# Patient Record
Sex: MALE | Race: WHITE | Employment: FULL TIME | URBAN - METROPOLITAN AREA
[De-identification: names, ages, dates, MRNs, and addresses within clinical notes are randomized per-mention and may not be internally consistent; named-entity substitution may affect disease eponyms.]

---

## 2022-01-16 ENCOUNTER — HOSPITAL ENCOUNTER (OUTPATIENT)
Age: 52
Setting detail: OBSERVATION
Discharge: HOME | End: 2022-01-17
Attending: EMERGENCY MEDICINE | Admitting: INTERNAL MEDICINE
Payer: COMMERCIAL

## 2022-01-16 ENCOUNTER — HOSPITAL ENCOUNTER (INPATIENT)
Dept: MRI IMAGING | Age: 52
Discharge: HOME | End: 2022-01-16
Attending: INTERNAL MEDICINE
Payer: COMMERCIAL

## 2022-01-16 ENCOUNTER — APPOINTMENT (OUTPATIENT)
Dept: CT IMAGING | Age: 52
End: 2022-01-16
Attending: EMERGENCY MEDICINE
Payer: COMMERCIAL

## 2022-01-16 ENCOUNTER — APPOINTMENT (OUTPATIENT)
Dept: GENERAL RADIOLOGY | Age: 52
End: 2022-01-16
Attending: INTERNAL MEDICINE
Payer: COMMERCIAL

## 2022-01-16 DIAGNOSIS — R42 DIZZINESS: ICD-10-CM

## 2022-01-16 DIAGNOSIS — G45.9 TIA (TRANSIENT ISCHEMIC ATTACK): Primary | ICD-10-CM

## 2022-01-16 PROBLEM — E87.1 HYPONATREMIA: Status: ACTIVE | Noted: 2022-01-16

## 2022-01-16 PROBLEM — E11.9 DM (DIABETES MELLITUS), TYPE 2 (HCC): Status: ACTIVE | Noted: 2022-01-16

## 2022-01-16 PROBLEM — F10.10 ALCOHOL ABUSE: Status: ACTIVE | Noted: 2022-01-16

## 2022-01-16 PROBLEM — I10 HTN (HYPERTENSION), BENIGN: Status: ACTIVE | Noted: 2022-01-16

## 2022-01-16 PROBLEM — F19.90 SUBSTANCE USE: Status: ACTIVE | Noted: 2022-01-16

## 2022-01-16 PROBLEM — G92.8 TOXIC METABOLIC ENCEPHALOPATHY: Status: ACTIVE | Noted: 2022-01-16

## 2022-01-16 PROBLEM — R29.898 LOWER EXTREMITY WEAKNESS: Status: ACTIVE | Noted: 2022-01-16

## 2022-01-16 LAB
ALBUMIN SERPL-MCNC: 3.4 G/DL (ref 3.5–5)
ALBUMIN/GLOB SERPL: 0.8 {RATIO} (ref 1.1–2.2)
ALP SERPL-CCNC: 65 U/L (ref 45–117)
ALT SERPL-CCNC: 33 U/L (ref 12–78)
AMPHET UR QL SCN: NEGATIVE
ANION GAP SERPL CALC-SCNC: 7 MMOL/L (ref 5–15)
AST SERPL-CCNC: 27 U/L (ref 15–37)
ATRIAL RATE: 64 BPM
BARBITURATES UR QL SCN: NEGATIVE
BASOPHILS # BLD: 0.1 K/UL (ref 0–0.1)
BASOPHILS NFR BLD: 1 % (ref 0–1)
BENZODIAZ UR QL: NEGATIVE
BILIRUB SERPL-MCNC: 0.7 MG/DL (ref 0.2–1)
BUN SERPL-MCNC: 17 MG/DL (ref 6–20)
BUN/CREAT SERPL: 21 (ref 12–20)
CALCIUM SERPL-MCNC: 9.7 MG/DL (ref 8.5–10.1)
CALCULATED P AXIS, ECG09: 15 DEGREES
CALCULATED R AXIS, ECG10: 30 DEGREES
CALCULATED T AXIS, ECG11: 110 DEGREES
CANNABINOIDS UR QL SCN: POSITIVE
CHLORIDE SERPL-SCNC: 97 MMOL/L (ref 97–108)
CO2 SERPL-SCNC: 26 MMOL/L (ref 21–32)
COCAINE UR QL SCN: POSITIVE
COMMENT, HOLDF: NORMAL
COMMENT, HOLDF: NORMAL
COVID-19 RAPID TEST, COVR: NOT DETECTED
CREAT SERPL-MCNC: 0.82 MG/DL (ref 0.7–1.3)
DIAGNOSIS, 93000: NORMAL
DIFFERENTIAL METHOD BLD: ABNORMAL
DRUG SCRN COMMENT,DRGCM: ABNORMAL
EOSINOPHIL # BLD: 0.3 K/UL (ref 0–0.4)
EOSINOPHIL NFR BLD: 3 % (ref 0–7)
ERYTHROCYTE [DISTWIDTH] IN BLOOD BY AUTOMATED COUNT: 13.6 % (ref 11.5–14.5)
ETHANOL SERPL-MCNC: <10 MG/DL
GLOBULIN SER CALC-MCNC: 4.4 G/DL (ref 2–4)
GLUCOSE BLD STRIP.AUTO-MCNC: 123 MG/DL (ref 65–117)
GLUCOSE BLD STRIP.AUTO-MCNC: 143 MG/DL (ref 65–117)
GLUCOSE BLD STRIP.AUTO-MCNC: 187 MG/DL (ref 65–117)
GLUCOSE BLD STRIP.AUTO-MCNC: 194 MG/DL (ref 65–117)
GLUCOSE SERPL-MCNC: 169 MG/DL (ref 65–100)
HCT VFR BLD AUTO: 39.8 % (ref 36.6–50.3)
HGB BLD-MCNC: 13.2 G/DL (ref 12.1–17)
IMM GRANULOCYTES # BLD AUTO: 0 K/UL (ref 0–0.04)
IMM GRANULOCYTES NFR BLD AUTO: 0 % (ref 0–0.5)
INR PPP: 1 (ref 0.9–1.1)
LYMPHOCYTES # BLD: 1.8 K/UL (ref 0.8–3.5)
LYMPHOCYTES NFR BLD: 20 % (ref 12–49)
MAGNESIUM SERPL-MCNC: 2.4 MG/DL (ref 1.6–2.4)
MCH RBC QN AUTO: 30 PG (ref 26–34)
MCHC RBC AUTO-ENTMCNC: 33.2 G/DL (ref 30–36.5)
MCV RBC AUTO: 90.5 FL (ref 80–99)
METHADONE UR QL: NEGATIVE
MONOCYTES # BLD: 0.8 K/UL (ref 0–1)
MONOCYTES NFR BLD: 9 % (ref 5–13)
NEUTS SEG # BLD: 6.1 K/UL (ref 1.8–8)
NEUTS SEG NFR BLD: 68 % (ref 32–75)
NRBC # BLD: 0 K/UL (ref 0–0.01)
NRBC BLD-RTO: 0 PER 100 WBC
OPIATES UR QL: NEGATIVE
P-R INTERVAL, ECG05: 156 MS
PCP UR QL: NEGATIVE
PLATELET # BLD AUTO: 133 K/UL (ref 150–400)
PMV BLD AUTO: 11.3 FL (ref 8.9–12.9)
POTASSIUM SERPL-SCNC: 4.5 MMOL/L (ref 3.5–5.1)
PROT SERPL-MCNC: 7.8 G/DL (ref 6.4–8.2)
PROTHROMBIN TIME: 10.4 SEC (ref 9–11.1)
Q-T INTERVAL, ECG07: 414 MS
QRS DURATION, ECG06: 88 MS
QTC CALCULATION (BEZET), ECG08: 427 MS
RBC # BLD AUTO: 4.4 M/UL (ref 4.1–5.7)
SAMPLES BEING HELD,HOLD: NORMAL
SAMPLES BEING HELD,HOLD: NORMAL
SERVICE CMNT-IMP: ABNORMAL
SODIUM SERPL-SCNC: 130 MMOL/L (ref 136–145)
SOURCE, COVRS: NORMAL
TROPONIN-HIGH SENSITIVITY: 6 NG/L (ref 0–76)
TSH SERPL DL<=0.05 MIU/L-ACNC: 0.75 UIU/ML (ref 0.36–3.74)
VENTRICULAR RATE, ECG03: 64 BPM
WBC # BLD AUTO: 9 K/UL (ref 4.1–11.1)

## 2022-01-16 PROCEDURE — 82962 GLUCOSE BLOOD TEST: CPT

## 2022-01-16 PROCEDURE — 65660000000 HC RM CCU STEPDOWN

## 2022-01-16 PROCEDURE — 74011000250 HC RX REV CODE- 250: Performed by: INTERNAL MEDICINE

## 2022-01-16 PROCEDURE — 96375 TX/PRO/DX INJ NEW DRUG ADDON: CPT

## 2022-01-16 PROCEDURE — 36415 COLL VENOUS BLD VENIPUNCTURE: CPT

## 2022-01-16 PROCEDURE — 84484 ASSAY OF TROPONIN QUANT: CPT

## 2022-01-16 PROCEDURE — 82077 ASSAY SPEC XCP UR&BREATH IA: CPT

## 2022-01-16 PROCEDURE — 83735 ASSAY OF MAGNESIUM: CPT

## 2022-01-16 PROCEDURE — 70551 MRI BRAIN STEM W/O DYE: CPT

## 2022-01-16 PROCEDURE — 93005 ELECTROCARDIOGRAM TRACING: CPT

## 2022-01-16 PROCEDURE — 74011250636 HC RX REV CODE- 250/636: Performed by: INTERNAL MEDICINE

## 2022-01-16 PROCEDURE — 70496 CT ANGIOGRAPHY HEAD: CPT

## 2022-01-16 PROCEDURE — 87635 SARS-COV-2 COVID-19 AMP PRB: CPT

## 2022-01-16 PROCEDURE — 74011250636 HC RX REV CODE- 250/636

## 2022-01-16 PROCEDURE — 80053 COMPREHEN METABOLIC PANEL: CPT

## 2022-01-16 PROCEDURE — 96374 THER/PROPH/DIAG INJ IV PUSH: CPT

## 2022-01-16 PROCEDURE — 0042T CT CODE NEURO PERF W CBF: CPT

## 2022-01-16 PROCEDURE — 85025 COMPLETE CBC W/AUTO DIFF WBC: CPT

## 2022-01-16 PROCEDURE — 96366 THER/PROPH/DIAG IV INF ADDON: CPT

## 2022-01-16 PROCEDURE — 84443 ASSAY THYROID STIM HORMONE: CPT

## 2022-01-16 PROCEDURE — 71045 X-RAY EXAM CHEST 1 VIEW: CPT

## 2022-01-16 PROCEDURE — 96365 THER/PROPH/DIAG IV INF INIT: CPT

## 2022-01-16 PROCEDURE — 85610 PROTHROMBIN TIME: CPT

## 2022-01-16 PROCEDURE — 99285 EMERGENCY DEPT VISIT HI MDM: CPT

## 2022-01-16 PROCEDURE — 74011636637 HC RX REV CODE- 636/637: Performed by: INTERNAL MEDICINE

## 2022-01-16 PROCEDURE — 70450 CT HEAD/BRAIN W/O DYE: CPT

## 2022-01-16 PROCEDURE — 74011000636 HC RX REV CODE- 636: Performed by: STUDENT IN AN ORGANIZED HEALTH CARE EDUCATION/TRAINING PROGRAM

## 2022-01-16 PROCEDURE — 96372 THER/PROPH/DIAG INJ SC/IM: CPT

## 2022-01-16 PROCEDURE — G0378 HOSPITAL OBSERVATION PER HR: HCPCS

## 2022-01-16 PROCEDURE — 80307 DRUG TEST PRSMV CHEM ANLYZR: CPT

## 2022-01-16 RX ORDER — ACETAMINOPHEN 325 MG/1
650 TABLET ORAL
Status: DISCONTINUED | OUTPATIENT
Start: 2022-01-16 | End: 2022-01-17 | Stop reason: HOSPADM

## 2022-01-16 RX ORDER — MAGNESIUM SULFATE 100 %
4 CRYSTALS MISCELLANEOUS AS NEEDED
Status: DISCONTINUED | OUTPATIENT
Start: 2022-01-16 | End: 2022-01-17 | Stop reason: HOSPADM

## 2022-01-16 RX ORDER — LABETALOL HCL 20 MG/4 ML
5 SYRINGE (ML) INTRAVENOUS
Status: DISCONTINUED | OUTPATIENT
Start: 2022-01-16 | End: 2022-01-17 | Stop reason: HOSPADM

## 2022-01-16 RX ORDER — SODIUM CHLORIDE 9 MG/ML
75 INJECTION, SOLUTION INTRAVENOUS CONTINUOUS
Status: DISPENSED | OUTPATIENT
Start: 2022-01-16 | End: 2022-01-17

## 2022-01-16 RX ORDER — ONDANSETRON 2 MG/ML
4 INJECTION INTRAMUSCULAR; INTRAVENOUS
Status: COMPLETED | OUTPATIENT
Start: 2022-01-16 | End: 2022-01-16

## 2022-01-16 RX ORDER — DEXTROSE 50 % IN WATER (D50W) INTRAVENOUS SYRINGE
12.5-25 AS NEEDED
Status: DISCONTINUED | OUTPATIENT
Start: 2022-01-16 | End: 2022-01-17 | Stop reason: HOSPADM

## 2022-01-16 RX ORDER — INSULIN LISPRO 100 [IU]/ML
INJECTION, SOLUTION INTRAVENOUS; SUBCUTANEOUS
Status: DISCONTINUED | OUTPATIENT
Start: 2022-01-16 | End: 2022-01-17 | Stop reason: HOSPADM

## 2022-01-16 RX ORDER — ACETAMINOPHEN 650 MG/1
650 SUPPOSITORY RECTAL
Status: DISCONTINUED | OUTPATIENT
Start: 2022-01-16 | End: 2022-01-17 | Stop reason: HOSPADM

## 2022-01-16 RX ORDER — LORAZEPAM 2 MG/ML
1 INJECTION INTRAMUSCULAR
Status: DISCONTINUED | OUTPATIENT
Start: 2022-01-16 | End: 2022-01-17 | Stop reason: HOSPADM

## 2022-01-16 RX ORDER — ONDANSETRON 2 MG/ML
INJECTION INTRAMUSCULAR; INTRAVENOUS
Status: COMPLETED
Start: 2022-01-16 | End: 2022-01-16

## 2022-01-16 RX ORDER — GUAIFENESIN 100 MG/5ML
81 LIQUID (ML) ORAL DAILY
Status: DISCONTINUED | OUTPATIENT
Start: 2022-01-17 | End: 2022-01-17 | Stop reason: HOSPADM

## 2022-01-16 RX ORDER — ENOXAPARIN SODIUM 100 MG/ML
30 INJECTION SUBCUTANEOUS 2 TIMES DAILY
Status: DISCONTINUED | OUTPATIENT
Start: 2022-01-16 | End: 2022-01-17 | Stop reason: HOSPADM

## 2022-01-16 RX ADMIN — FOLIC ACID: 5 INJECTION, SOLUTION INTRAMUSCULAR; INTRAVENOUS; SUBCUTANEOUS at 13:18

## 2022-01-16 RX ADMIN — IOPAMIDOL 40 ML: 755 INJECTION, SOLUTION INTRAVENOUS at 10:15

## 2022-01-16 RX ADMIN — SODIUM CHLORIDE 75 ML/HR: 9 INJECTION, SOLUTION INTRAVENOUS at 12:41

## 2022-01-16 RX ADMIN — ENOXAPARIN SODIUM 30 MG: 100 INJECTION SUBCUTANEOUS at 12:32

## 2022-01-16 RX ADMIN — ONDANSETRON 4 MG: 2 INJECTION INTRAMUSCULAR; INTRAVENOUS at 08:45

## 2022-01-16 RX ADMIN — INSULIN LISPRO 2 UNITS: 100 INJECTION, SOLUTION INTRAVENOUS; SUBCUTANEOUS at 16:49

## 2022-01-16 RX ADMIN — IOPAMIDOL 100 ML: 755 INJECTION, SOLUTION INTRAVENOUS at 10:15

## 2022-01-16 RX ADMIN — ENOXAPARIN SODIUM 30 MG: 100 INJECTION SUBCUTANEOUS at 21:41

## 2022-01-16 NOTE — Clinical Note
Status[de-identified] INPATIENT [101]   Type of Bed: Neuro/Stroke [9]   Cardiac Monitoring Required?: Yes   Inpatient Hospitalization Certified Necessary for the Following Reasons: 3.  Patient receiving treatment that can only be provided in an inpatient setting (further clarification in H&P documentation)   Admitting Diagnosis: Toxic metabolic encephalopathy [2879490]   Admitting Physician: Hugo Galeano   Attending Physician: Eamon Wilkinson [[id number]]   Estimated Length of Stay: 2 Midnights   Discharge Plan[de-identified] Home with Office Follow-up

## 2022-01-16 NOTE — PROGRESS NOTES
CARE MANAGEMENT NOTE      Per nursing note, Pt is oriented x4 but drowsy at this time. CM reviewed clinicals and no NOK is listed to assist with assessment. No previous encounters with [facility]. CM will follow up when Pt is able to complete assessment or NOK is identified.     _____________________________________  DANIEL Mena - Care Management  1/16/2022   4:16 PM

## 2022-01-16 NOTE — ED PROVIDER NOTES
72-year-old male presents with a chief complaint of dizziness and lower extremity weakness. Patient's not able to provide a clear history. He does state that his symptoms started after he got out of the shower this morning. He denies any chest pain, back pain, abdominal pain, GI or urinary symptoms               No past medical history on file. No past surgical history on file. No family history on file. Social History     Socioeconomic History    Marital status: Not on file     Spouse name: Not on file    Number of children: Not on file    Years of education: Not on file    Highest education level: Not on file   Occupational History    Not on file   Tobacco Use    Smoking status: Not on file    Smokeless tobacco: Not on file   Substance and Sexual Activity    Alcohol use: Not on file    Drug use: Not on file    Sexual activity: Not on file   Other Topics Concern    Not on file   Social History Narrative    Not on file     Social Determinants of Health     Financial Resource Strain:     Difficulty of Paying Living Expenses: Not on file   Food Insecurity:     Worried About Running Out of Food in the Last Year: Not on file    Renetta of Food in the Last Year: Not on file   Transportation Needs:     Lack of Transportation (Medical): Not on file    Lack of Transportation (Non-Medical):  Not on file   Physical Activity:     Days of Exercise per Week: Not on file    Minutes of Exercise per Session: Not on file   Stress:     Feeling of Stress : Not on file   Social Connections:     Frequency of Communication with Friends and Family: Not on file    Frequency of Social Gatherings with Friends and Family: Not on file    Attends Catholic Services: Not on file    Active Member of Clubs or Organizations: Not on file    Attends Club or Organization Meetings: Not on file    Marital Status: Not on file   Intimate Partner Violence:     Fear of Current or Ex-Partner: Not on file   [place] Emotionally Abused: Not on file    Physically Abused: Not on file    Sexually Abused: Not on file   Housing Stability:     Unable to Pay for Housing in the Last Year: Not on file    Number of Places Lived in the Last Year: Not on file    Unstable Housing in the Last Year: Not on file         ALLERGIES: Patient has no allergy information on record. Review of Systems   Constitutional: Negative for fever. HENT: Negative for rhinorrhea. Respiratory: Negative for cough. Cardiovascular: Negative for chest pain. Gastrointestinal: Negative for abdominal pain. Genitourinary: Negative for dysuria. Musculoskeletal: Negative for back pain. Skin: Negative for wound. Neurological: Positive for dizziness and weakness. Negative for headaches. Psychiatric/Behavioral: Negative for confusion. There were no vitals filed for this visit. Physical Exam  Vitals and nursing note reviewed. Constitutional:       General: He is not in acute distress. Appearance: Normal appearance. He is not ill-appearing, toxic-appearing or diaphoretic. HENT:      Head: Normocephalic. Eyes:      Extraocular Movements: Extraocular movements intact. Cardiovascular:      Rate and Rhythm: Normal rate. Pulses: Normal pulses. Heart sounds: Normal heart sounds. Pulmonary:      Effort: Pulmonary effort is normal. No respiratory distress. Breath sounds: Normal breath sounds. Abdominal:      General: There is no distension. Musculoskeletal:         General: Normal range of motion. Cervical back: Normal range of motion. Skin:     General: Skin is dry. Capillary Refill: Capillary refill takes less than 2 seconds. Neurological:      General: No focal deficit present. Mental Status: He is alert and oriented to person, place, and time.    Psychiatric:         Mood and Affect: Mood normal.          MDM  Number of Diagnoses or Management Options  Dizziness  TIA (transient ischemic attack)  Diagnosis management comments: Patient presents with dizziness and concern for stroke. Level One stroke alert was activated due to the time of onset of the patient's symptoms, just prior to arrival.  CT imaging unremarkable. Patient evaluated by teleneurology. His NIH score is 0. He was not given tPA due to low NIH.   He will be admitted for stroke work-up    EKG shows sinus rhythm at a rate of 64, normal intervals, normal axis, no ischemic changes    Perfect Serve Consult for Admission  11:00 AM    ED Room Number: ER02/02  Patient Name and age:  Rolly Saini 54 y.o.  male  Working Diagnosis: TIA (transient ischemic attack)  (primary encounter diagnosis)  Dizziness    COVID-19 Suspicion:  no  Sepsis present:  no  Reassessment needed: no  Code Status:  Full Code  Readmission: no  Isolation Requirements:  no  Recommended Level of Care:  telemetry  Department:[facility] ED - [phone]  Other:  CT neg, tele neuro recommends admission for stroke work up     Total critical care time spent exclusive of procedures:  37 minutes           Amount and/or Complexity of Data Reviewed  Clinical lab tests: ordered and reviewed  Tests in the radiology section of CPT®: ordered and reviewed           Procedures

## 2022-01-16 NOTE — ED TRIAGE NOTES
Patient presents to ED via EMS for reported bilateral lower extremity weakness since around 7AM this morning. Reports he had an episode of chest pain earlier but denies any pain at triage. Reports feeling very weak and tired. Patient able to transfer himself to ED bed with no assistance. Patient oriented at triage. Confirms he had 12 beers last night and normally drinks once a week. Denies any drug use.

## 2022-01-16 NOTE — H&P
[address]  [address] [place] [address]  [phone]    Admission History and Physical      NAME:       Eliu Baker   :       1970   MRN:      [id number]     PCP:      Unknown, Provider, DM     Date of service:   2022     Chief  Complaint: Dizziness, lower extremity weakness, chest pain    History Of Presenting Illness:       Mr. Fabián Greer is a 54 y.o. male who is being admitted for a suspected CVA. Mr. Fabián Greer presented to our Emergency Department today complaining of dizziness and lower extremity weakness. He is not able to give a clear Hx at the time of review and he declines me to call anyone for a collaborative Hx. As per the ED, he had presented with lower extremity following a night of having drank more beer than usual. He was noted to be hypoxic with no overt focal weakness. Code stroke was called and CT imaging done thus far are neg for any acute changes. He is able to tell me he has DM type 2 and HTN but then lapses to sleep. He will be observed in hospital for close monitoring and further testing. No Known Allergies    Prior to Admission medications    None       Past Medical History:   Diagnosis Date    DM (diabetes mellitus), type 2 ([address]     HTN (hypertension), benign         No past surgical history on file.     Social History     Tobacco Use    Smoking status: Not on file    Smokeless tobacco: Not on file   Substance Use Topics    Alcohol use: Not on file      Family history: unobtainable due to current patient's condition     Review of Systems: unobtainable from the patient given current status    Examination:    Constitutional:    Visit Vitals  BP (!) 126/107   Pulse 67   Temp 98.7 °F (37.1 °C)   Resp 21   Ht 6' (1.829 m)   Wt 102.7 kg (226 lb 6.6 oz)   SpO2 97%   BMI 30.71 kg/m²         General:  Weak and ill looking patient in no acute distress  Eyes: Pink conjunctivae, PERRLA with no discharge. Normal eye movements  Ear, Nose, Mouth & Throat: No ottorrhea, rhinorrhea, non tender sinuses, dry mucous membranes  Respiratory:  No accessory muscle use, clear breath sounds without crackles or wheezes  Cardiovascular:  No JVD or murmurs, regular and normal S1, S2 without thrills, bruits or peripheral edema. GI & :  Soft abdomen, non-tender, non-distended, normoactive bowel sounds with no palpable organomegaly  Heme:  No cervical or axillary adenopathy. Musculoskeletal:  No cyanosis, clubbing, atrophy or deformities  Skin:  No rashes, bruising or ulcers   Neurological: Awake but lapses to sleep. Occasional jerking movements. Speech appears clear. No facial droop. Follows commands. Moves all extremities. Psychiatric:  Has no insight to his illness   ________________________________________________________________________    Data Review:    Labs:    Recent Labs     01/16/22  0845   WBC 9.0   HGB 13.2   HCT 39.8   *     Recent Labs     01/16/22  0845   *   K 4.5   CL 97   CO2 26   *   BUN 17   CREA 0.82   CA 9.7   MG 2.4   ALB 3.4*   ALT 33     No components found for: GLPOC  No results for input(s): PH, PCO2, PO2, HCO3, FIO2 in the last 72 hours. Recent Labs     01/16/22  0845   INR 1.0       Imaging Studies:      CT code neuro - all reviewed     Personally reviewed 12 lead EKG: Normal rate, rhythm, axis and intervals. and No acute changes suggestive of ischemia    I have also reviewed available old medical records.      Assessment & Impression:     Mr. Chu Perry is a 54 y.o. male being evaluated for:     Principal Problem:    Toxic metabolic encephalopathy (3/49/4915)    Active Problems:    Lower extremity weakness (1/16/2022)      Hyponatremia (1/16/2022)      Alcohol abuse (1/16/2022)      Substance use (1/16/2022)      DM (diabetes mellitus), type 2 ([address] (1/16/2022)      HTN (hypertension), benign (1/16/2022)         Plan of management:    Toxic metabolic encephalopathy (6/78/7013) POA: I suspect his symptoms are due to alcohol ingestion in addition to substance abuse - UDS was + for cocaine and THC. Observe in hospital. Start IV fluids. Neuro checks and follow clinical progress. Will need outpatient counseling    Lower extremity weakness (1/16/2022) POA: with dizziness initially concerning for acute CVA. CT scans unremarkable. Will obtain an MRi brain    Hyponatremia (1/16/2022) POA: mild. Likely related to his beer ingestion vs poor oral intake. Start IV fluids for now and monitor lytes. Alcohol abuse (1/16/2022) / Substance use (1/16/2022) POA: now with symptoms of toxicity. Start a CIWA protocol given unknown risk for withdrawal    DM (diabetes mellitus), type 2 ([facility] (1/16/2022) POA: check an A1c. DM diet when more awake. SSi per protocol    HTN (hypertension), benign (1/16/2022) POA: BP stable for now. Hypoxia - unclear etiology.  Get a CXR and a rapid COVID test    Code Status:  Full    Surrogate decision maker: Family    Risk of deterioration: high      Total time spent for the care of the patient: [address] discussed with: Patient, Nursing Staff and ED physician    Discussed:  Code Status, Care Plan and D/C Planning    Prophylaxis:  Lovenox    Probable Disposition:  Home w/Family           ___________________________________________________    Attending Physician: Lilo Gonzalez MD

## 2022-01-16 NOTE — PROGRESS NOTES
[facility] Dosing Services:      54 y.o. male,   Wt Readings from Last 1 Encounters:   01/16/22 102.7 kg (226 lb 6.6 oz)       Ht Readings from Last 1 Encounters:   01/16/22 182.9 cm (72\")         Indication DVT PPX    Previous Dose 40 mg q 24 hr   New dose  30mg q 12 hr   Creatinine Clearance Estimated Creatinine Clearance: 135 mL/min (based on SCr of 0.82 mg/dL). Creatinine Lab Results   Component Value Date/Time    Creatinine 0.82 01/16/2022 08:45 AM       Platelet Lab Results   Component Value Date/Time    PLATELET 501 (L) 64/01/2263 08:45 AM      H/H Lab Results   Component Value Date/Time    HGB 13.2 01/16/2022 08:45 AM         Epidural Catheter? NO    Other anticoagulants: no      Enoxaparin dosing protocol for Prophylaxis    Patient Weight in Kg     Estimated CrCl (ml/min)   >/= 50   101-150 151-174  >/= 175    >/= 30  30 mg daily 40 mg daily or 30 mg BID (ortho) 30 mg BID 40 mg BID 60 mg BID    15-29 UFH 5000U BID 30 mg daily 30 mg daily 40 mg daily 60 mg daily    < 15 or dialysis UFH 5000U BID UFH 5000U TID UFH 7500U TID         Enoxaparin dosing protocol for Treatment dosing (Based on 1 mg/kg BID for DVT,PE,Afib)   Patient Weight in Kg     Estimated CrCl (ml/min)   </= 50   101-150 151-174  >/= 190    >/= 30 Switch to [facility] standardized UFH infusion, Apixaban or rivaroxaban 1 mg/kg BID 30 mg BID 1 mg/kg BID if anti Xa levels are feasible,  Alternatively switch to [facility] standardized UFH infusion       Switch to [facility] standardized UFH infusion    15-29 Switch to [facility] standardized UFH infusion or apixaban 1 mg/kg daily 30 mg daily Switch to [facility] standardized UFH infusion     < 15 or dialysis Switch to [facility] standardized UFH infusion     Pharmacy to automatically make dose adjustment for renal dysfunction (creatinine clearance less than 30 mL/min) and/or obesity (BMI greater than 40) per [facility] dose adjustment scale. Pharmacy to monitor patients progress.   Will make dose adjustment as needed per changing renal function. Will communicate further recommendations regarding patients anticoagulation therapy with prescriber.     Signed Brett Shipman PHARMD . Contact information: [phone]

## 2022-01-17 ENCOUNTER — APPOINTMENT (OUTPATIENT)
Dept: NON INVASIVE DIAGNOSTICS | Age: 52
End: 2022-01-17
Attending: INTERNAL MEDICINE
Payer: COMMERCIAL

## 2022-01-17 VITALS
SYSTOLIC BLOOD PRESSURE: 117 MMHG | HEART RATE: 59 BPM | OXYGEN SATURATION: 94 % | BODY MASS INDEX: 30.61 KG/M2 | WEIGHT: 226 LBS | TEMPERATURE: 98.4 F | DIASTOLIC BLOOD PRESSURE: 72 MMHG | RESPIRATION RATE: 17 BRPM | HEIGHT: 72 IN

## 2022-01-17 PROBLEM — G45.9 TIA (TRANSIENT ISCHEMIC ATTACK): Status: ACTIVE | Noted: 2022-01-17

## 2022-01-17 LAB
ANION GAP SERPL CALC-SCNC: 2 MMOL/L (ref 5–15)
BUN SERPL-MCNC: 13 MG/DL (ref 6–20)
BUN/CREAT SERPL: 17 (ref 12–20)
CALCIUM SERPL-MCNC: 9.4 MG/DL (ref 8.5–10.1)
CHLORIDE SERPL-SCNC: 106 MMOL/L (ref 97–108)
CHOLEST SERPL-MCNC: 190 MG/DL
CO2 SERPL-SCNC: 30 MMOL/L (ref 21–32)
CREAT SERPL-MCNC: 0.76 MG/DL (ref 0.7–1.3)
ECHO AO ASC DIAM: 3.8 CM
ECHO AO ASCENDING AORTA INDEX: 1.7 CM/M2
ECHO AV AREA PEAK VELOCITY: 3.1 CM2
ECHO AV AREA PEAK VELOCITY: 3.1 CM2
ECHO AV AREA VTI: 2.9 CM2
ECHO AV AREA VTI: 2.9 CM2
ECHO AV MEAN GRADIENT: 3 MMHG
ECHO AV MEAN VELOCITY: 0.8 M/S
ECHO AV PEAK GRADIENT: 6 MMHG
ECHO AV PEAK VELOCITY: 1.2 M/S
ECHO AV VELOCITY RATIO: 0.83
ECHO AV VTI: 23.6 CM
ECHO LA DIAMETER INDEX: 2.1 CM/M2
ECHO LA DIAMETER: 4.7 CM
ECHO LA VOL 2C: 61 ML (ref 18–58)
ECHO LA VOL 4C: 61 ML (ref 18–58)
ECHO LA VOL BP: 65 ML (ref 18–58)
ECHO LA VOL BP: 65 ML (ref 18–58)
ECHO LA VOLUME AREA LENGTH: 70 ML
ECHO LA VOLUME INDEX A2C: 27 ML/M2 (ref 16–34)
ECHO LA VOLUME INDEX A4C: 27 ML/M2 (ref 16–34)
ECHO LA VOLUME INDEX AREA LENGTH: 31 ML/M2 (ref 16–34)
ECHO LV E' LATERAL VELOCITY: 9 CM/S
ECHO LV E' SEPTAL VELOCITY: 8 CM/S
ECHO LV FRACTIONAL SHORTENING: 30 % (ref 28–44)
ECHO LV INTERNAL DIMENSION DIASTOLE INDEX: 1.96 CM/M2
ECHO LV INTERNAL DIMENSION DIASTOLIC: 4.4 CM (ref 4.2–5.9)
ECHO LV INTERNAL DIMENSION SYSTOLIC INDEX: 1.38 CM/M2
ECHO LV INTERNAL DIMENSION SYSTOLIC: 3.1 CM
ECHO LV IVSD: 1.3 CM (ref 0.6–1)
ECHO LV MASS 2D: 227.5 G (ref 88–224)
ECHO LV MASS INDEX 2D: 101.6 G/M2 (ref 49–115)
ECHO LV POSTERIOR WALL DIASTOLIC: 1.4 CM (ref 0.6–1)
ECHO LV RELATIVE WALL THICKNESS RATIO: 0.64
ECHO LVOT AREA: 3.8 CM2
ECHO LVOT AV VTI INDEX: 0.8
ECHO LVOT DIAM: 2.2 CM
ECHO LVOT MEAN GRADIENT: 2 MMHG
ECHO LVOT PEAK GRADIENT: 4 MMHG
ECHO LVOT PEAK VELOCITY: 1 M/S
ECHO LVOT STROKE VOLUME INDEX: 32.1 ML/M2
ECHO LVOT SV: 71.8 ML
ECHO LVOT VTI: 18.9 CM
ECHO MV A VELOCITY: 0.71 M/S
ECHO MV E DECELERATION TIME (DT): 224.5 MS
ECHO MV E VELOCITY: 0.81 M/S
ECHO MV E/A RATIO: 1.14
ECHO MV E/E' LATERAL: 9
ECHO MV E/E' RATIO (AVERAGED): 9.56
ECHO MV E/E' SEPTAL: 10.13
ECHO PV MAX VELOCITY: 1.1 M/S
ECHO PV PEAK GRADIENT: 5 MMHG
ECHO RV INTERNAL DIMENSION: 4.5 CM
ECHO RV TAPSE: 1.9 CM (ref 1.5–2)
ECHO RVOT PEAK GRADIENT: 4 MMHG
ECHO RVOT PEAK VELOCITY: 0.9 M/S
ECHO TV REGURGITANT MAX VELOCITY: 2.49 M/S
ECHO TV REGURGITANT PEAK GRADIENT: 25 MMHG
EST. AVERAGE GLUCOSE BLD GHB EST-MCNC: 131 MG/DL
GLUCOSE BLD STRIP.AUTO-MCNC: 117 MG/DL (ref 65–117)
GLUCOSE SERPL-MCNC: 120 MG/DL (ref 65–100)
HBA1C MFR BLD: 6.2 % (ref 4–5.6)
HDLC SERPL-MCNC: 68 MG/DL
HDLC SERPL: 2.8 {RATIO} (ref 0–5)
LDLC SERPL CALC-MCNC: 97.2 MG/DL (ref 0–100)
MAGNESIUM SERPL-MCNC: 2.6 MG/DL (ref 1.6–2.4)
PHOSPHATE SERPL-MCNC: 3 MG/DL (ref 2.6–4.7)
POTASSIUM SERPL-SCNC: 4.3 MMOL/L (ref 3.5–5.1)
SERVICE CMNT-IMP: NORMAL
SODIUM SERPL-SCNC: 138 MMOL/L (ref 136–145)
TRIGL SERPL-MCNC: 124 MG/DL (ref ?–150)
VLDLC SERPL CALC-MCNC: 24.8 MG/DL

## 2022-01-17 PROCEDURE — 93306 TTE W/DOPPLER COMPLETE: CPT

## 2022-01-17 PROCEDURE — 96372 THER/PROPH/DIAG INJ SC/IM: CPT

## 2022-01-17 PROCEDURE — 93306 TTE W/DOPPLER COMPLETE: CPT | Performed by: INTERNAL MEDICINE

## 2022-01-17 PROCEDURE — 84100 ASSAY OF PHOSPHORUS: CPT

## 2022-01-17 PROCEDURE — 77010033678 HC OXYGEN DAILY

## 2022-01-17 PROCEDURE — G0378 HOSPITAL OBSERVATION PER HR: HCPCS

## 2022-01-17 PROCEDURE — 83735 ASSAY OF MAGNESIUM: CPT

## 2022-01-17 PROCEDURE — 82962 GLUCOSE BLOOD TEST: CPT

## 2022-01-17 PROCEDURE — 74011250637 HC RX REV CODE- 250/637: Performed by: INTERNAL MEDICINE

## 2022-01-17 PROCEDURE — 36415 COLL VENOUS BLD VENIPUNCTURE: CPT

## 2022-01-17 PROCEDURE — 74011250636 HC RX REV CODE- 250/636: Performed by: INTERNAL MEDICINE

## 2022-01-17 PROCEDURE — 83036 HEMOGLOBIN GLYCOSYLATED A1C: CPT

## 2022-01-17 PROCEDURE — 80048 BASIC METABOLIC PNL TOTAL CA: CPT

## 2022-01-17 PROCEDURE — 99205 OFFICE O/P NEW HI 60 MIN: CPT | Performed by: PSYCHIATRY & NEUROLOGY

## 2022-01-17 PROCEDURE — 94760 N-INVAS EAR/PLS OXIMETRY 1: CPT

## 2022-01-17 PROCEDURE — 96366 THER/PROPH/DIAG IV INF ADDON: CPT

## 2022-01-17 PROCEDURE — 80061 LIPID PANEL: CPT

## 2022-01-17 RX ORDER — ROSUVASTATIN CALCIUM 10 MG/1
20 TABLET, COATED ORAL
Status: DISCONTINUED | OUTPATIENT
Start: 2022-01-17 | End: 2022-01-17 | Stop reason: HOSPADM

## 2022-01-17 RX ORDER — GUAIFENESIN 100 MG/5ML
81 LIQUID (ML) ORAL DAILY
Qty: 30 TABLET | Refills: 1 | Status: SHIPPED | OUTPATIENT
Start: 2022-01-18 | End: 2022-02-17

## 2022-01-17 RX ORDER — ROSUVASTATIN CALCIUM 20 MG/1
20 TABLET, COATED ORAL
Qty: 30 TABLET | Refills: 1 | Status: SHIPPED | OUTPATIENT
Start: 2022-01-17 | End: 2022-02-16

## 2022-01-17 RX ADMIN — ENOXAPARIN SODIUM 30 MG: 100 INJECTION SUBCUTANEOUS at 11:47

## 2022-01-17 RX ADMIN — ASPIRIN 81 MG: 81 TABLET, CHEWABLE ORAL at 11:47

## 2022-01-17 RX ADMIN — SODIUM CHLORIDE 75 ML/HR: 9 INJECTION, SOLUTION INTRAVENOUS at 05:38

## 2022-01-17 NOTE — PROGRESS NOTES
OT order received, chart reviewed, nurse consulted. Patient reports return to baseline function and is not in need of therapy services. Patient was witnessed independently getting in and out of bed, ambulating to bathroom and toileting without assist, no devices needed, normal balance. NO therapy indicated. Completing order.     Ruby Cooper, OTR/L

## 2022-01-17 NOTE — DISCHARGE INSTRUCTIONS
HOSPITALIST DISCHARGE INSTRUCTIONS    NAME: Tima Posey   :  1970   MRN:  [id number]     Date:     2022    ADMIT DATE: 2022     DISCHARGE DATE: 2022     PRINCIPAL ADMITTING DIAGNOSIS:  Lower extremity weakness [E89.831]  Toxic metabolic encephalopathy [Q51.6]    DISCHARGE DIAGNOSES:  Principal Problem:    Toxic metabolic encephalopathy ()    TIA (transient ischemic attack) (2022)    Lower extremity weakness (2022)    Hyponatremia (2022)    Alcohol abuse (2022)    Substance use (2022)    DM (diabetes mellitus), type 2 ([address] (2022)    HTN (hypertension), benign (2022)    MEDICATIONS:    · It is important that medications are taken exactly as they are prescribed on the discharge medication instructions and keep them your  in the bottles provided by the pharmacist.   · Keep a list of the medication names, dosages, and times to be taken at all times. · Do not take other medications without consulting your doctor. Recommended diet:  Low fat, Low cholesterol, diabetic diet    Recommended activity: No driving for 3 months     Post discharge care:    Notify follow up health care provider or return to the emergency department if you cannot get hold of your doctor if you feel worse or experience symptoms similar to those that brought you to hospital    Follow-up Information     Follow up With Specialties Details Why Contact Elda Centeno NP Nurse Practitioner Schedule an appointment as soon as possible for a visit in 4 weeks Hospital Follow up [address]  [phone]      Unknown, Provider, DPM Podiatry   Patient not available to ask            Information obtained by :  I understand that if any problems occur once I am at home I am to contact my physician and I understand and acknowledge receipt of the instructions indicated above. Physician's or R.N.'s Signature                                                                  Date/Time                                                                                                                                              Patient or Representative Signature                                                          Date/Time

## 2022-01-17 NOTE — DISCHARGE SUMMARY
[facility]  [address]  Tel: [phone]    Physician Discharge Summary    Patient ID:    Atif Wood  Age:              54 y.o.    : 1970  MRN:             [id number]     PCP: Unknown, Provider, DPM     Date of Admission: 2022    Date of Discharge:  2022    Principal admission Diagnosis:   Lower extremity weakness [X30.725]  Toxic metabolic encephalopathy [K96.4]    Discharge Diagnoses:  Principal Problem:    Toxic metabolic encephalopathy ()    TIA (transient ischemic attack) (2022)    Lower extremity weakness (2022)    Alcohol abuse (2022)    Substance use (2022)    DM (diabetes mellitus), type 2 ([address] (2022)    HTN (hypertension), benign (2022)    Hospital Course:     Mr. Chu Perry is a 54 y.o. admitted to [facility] and treated for the following:    Toxic metabolic encephalopathy ()  / TIA (transient ischemic attack) (2022) / Lower extremity weakness (2022) POA: on admission, I had suspected his symptoms were due to alcohol ingestion in addition to substance abuse - UDS was + for cocaine and THC. He had taken more beer than usual on the night prior to coming to the ED. MRi brain was neg for acute CVA. Seen by neurology who suspected a TIA. LDL 97. A1c 6.2. Echo done and report pending to be reviewed during follow up with neuro. Advised NOT to drive x 3 months. Continue Asprin and Crestor. Follow up with neurology clinic. Hyponatremia (2022) POA: mild. Likely related to his beer ingestion vs poor oral intake. This has resolved with hydration. Alcohol abuse (2022) / Substance use (2022) POA: admitted with symptoms concerning for toxicity. No withdrawal symptoms. Now fully awake and alert. Advised on substance use cessation. DM (diabetes mellitus), type 2 ([address] (2022) POA: A1c 6.2.  Not on any medications. Diet control for now. HTN (hypertension), benign (1/16/2022) POA: BP stable. Not on any home medications. Hypoxia - unclear etiology. CXR clear. Rapid COVID test neg. Resolved. Discharge Exam:    Visit Vitals  /72   Pulse (!) 59   Temp 98.4 °F (36.9 °C)   Resp 17   Ht 6' (1.829 m)   Wt 102.7 kg (226 lb 6.6 oz)   SpO2 94%   BMI 30.71 kg/m²        Patient has been seen and examined. General: well looking and in no acute distress  Pulm: clear breath sounds without wheezes  Card: no murmurs, normal S1, S2 without thrills, bruits   Abd:    soft, non-tender, normoactive bowel sounds  Skin: no rashes and skin turgor is good  Neuro: awake, alert and has a non focal     Activity: Activity as tolerated    Diet: Low fat, Low cholesterol, Diabetic diet    Current Discharge Medication List        START taking these medications    Details   aspirin 81 mg chewable tablet Take 1 Tablet by mouth daily for 30 days. Indications: stroke prevention  Qty: 30 Tablet, Refills: 1      rosuvastatin (CRESTOR) 20 mg tablet Take 1 Tablet by mouth nightly for 30 days. Indications: high cholesterol, stroke prevention  Qty: 30 Tablet, Refills: 1             Follow-up Information       Follow up With Specialties Details Why Contact Info    Zena Humphries NP Nurse Practitioner Schedule an appointment as soon as possible for a visit in 4 weeks Hospital Follow up [place] [address]  [address] [facility]  [phone]      Unknown, Provider, DPM Podiatry   Patient not available to ask              Follow-up tests or labs: None    Discharge Condition: Stable    Disposition: home    Time taken to arrange discharge:  35 minutes.     Signed:  Fanny Vickers MD     [facility]  1/17/2022   12:00 PM

## 2022-01-17 NOTE — PROGRESS NOTES
Physical Therapy  1/17/2022    Orders received and acknowledged. Chart reviewed and cleared by RN. Pt received supine on stretcher and reports no difficulty with mobility. Has been up ad mega to/from hallway bathroom throughout the evening. Requesting toilet use and disconnected lines and leads. Pt completely independent off stretcher and ambulating in hallway without balance deficits or weakness noted. Dizziness has subsided since admission. No additional acute therapy needs. Will sign off. Reconnected to IV and leads and resting comfortably.     Thank You,  Mitchel Mendez, PT, DPT  Time Spent 7 minutes

## 2022-01-17 NOTE — ED NOTES
Bedside and Verbal shift change report given to Courtney Knowles RN (oncoming nurse) by Aliyah Izquierdo (offgoing nurse). Report included the following information SBAR, Kardex and ED Summary.

## 2022-01-17 NOTE — PROGRESS NOTES
[facility] and [address] at [facility] at [address] [place] [address], [address] [facility] [address]  [phone] Office  [phone] Facsimile           Referring: Dr. Tino Fisher    Chief Complaint   Patient presents with    Numbness    Lethargy     24-year-old gentleman who we are asked to see in neurologic consultation regarding an acute change in his neurologic status. He presented to the emergency department complaining of dizziness and lower extremity weakness. Symptoms started after getting out of the shower per report. Per his history and physical he was hypoxic with no overt focal weakness. He had a code stroke called. He was quite sleepy for his initial evaluation. In the emergency department he underwent code stroke process including code neuro head CT which was reviewed and normal.  CTA of the head and neck with no significant stenosis. Question left cavernous ICA diverticulum  CT perfusion unremarkable  MRI of the brain personally reviewed and unremarkable except for small vessel white matter change  Lipid panel with LDL of 97  Hemoglobin A1c 6.2  Presently he is on aspirin 81 mg  He tells me that he had the abrupt onset of vertigo described as spinning. He had no focal weakness although felt generally weak in his legs. It scared him enough to call EMS. He was brought to the emergency department. Symptoms quickly resolved. He does not take aspirin at home. No family history of stroke. Does not smoke. Does have diabetes and hypertension and notes he is compliant with his medication for such    Past Medical History:   Diagnosis Date    DM (diabetes mellitus), type 2 ([address]     HTN (hypertension), benign        No past surgical history on file.     Current Facility-Administered Medications   Medication Dose Route Frequency Provider Last Rate Last Admin    acetaminophen (TYLENOL) tablet 650 mg  650 mg Oral Q4H PRN Ruchi Sosa MD        Or    acetaminophen (TYLENOL) solution 650 mg  650 mg Per NG tube Q4H PRN Ruchi Sosa MD        Or    acetaminophen (TYLENOL) suppository 650 mg  650 mg Rectal Q4H PRN Ruchi Sosa MD        aspirin chewable tablet 81 mg  81 mg Oral DAILY Ruchi Sosa MD        labetaloL (NORMODYNE;TRANDATE) 20 mg/4 mL (5 mg/mL) injection 5 mg  5 mg IntraVENous Q10MIN PRN Ruchi Sosa MD        enoxaparin (LOVENOX) injection 30 mg  30 mg SubCUTAneous BID Ruchi Sosa MD   30 mg at 01/16/22 2141    insulin lispro (HUMALOG) injection   SubCUTAneous AC&HS Ruchi Sosa MD   2 Units at 01/16/22 1649    glucose chewable tablet 16 g  4 Tablet Oral PRN Ruchi Sosa MD        dextrose (D50W) injection syrg 12.5-25 g  12.5-25 g IntraVENous PRN Ruchi Sosa MD        glucagon (GLUCAGEN) injection 1 mg  1 mg IntraMUSCular PRN Ruchi Sosa MD        LORazepam (ATIVAN) injection 1 mg  1 mg IntraVENous Q4H PRN Ruchi Sosa MD            No Known Allergies    Social History     Tobacco Use    Smoking status: Not on file    Smokeless tobacco: Not on file   Substance Use Topics    Alcohol use: Not on file    Drug use: Not on file       No family history on file. Review of Systems  Pertinent positives and negatives as noted. Examination  Visit Vitals  /72   Pulse (!) 59   Temp 98.4 °F (36.9 °C)   Resp 17   Ht 6' (1.829 m)   Wt 102.7 kg (226 lb 6.6 oz)   SpO2 94%   BMI 30.71 kg/m²     Neurologically, he is awake, alert, oriented with normal speech, language, and cognition. Cranial nerves intact 2-12. He has normal bulk and tone. There is no abnormal movement. There is no pronation or drift. He is able to generate full strength in the upper and lower extremities and all muscle groups to direct confrontational testing. Reflexes are symmetrical in the upper and lower extremities bilaterally. No pathologic reflexes are elicited. He has no ataxia or past pointing. Impression/Plan  61-year-old gentleman with transient vertigo and lower extremity weakness  He certainly has risk factors for stroke and TIA including hypertension dyslipidemia and diabetes  Given this we will treat as TIA  Aspirin 81 mg daily  Start Crestor 20 mg daily  Small infundibulum as noted above we will follow that up in the office with a repeat head CT area in the future and consideration for NIS evaluation  Continue to modify risk factors for stroke including controlling his diabetes and his hypertension  Follow in the office    Tha Nichols MD          This note was created using voice recognition software. Despite editing, there may be syntax errors.
